# Patient Record
Sex: FEMALE | Race: OTHER | URBAN - METROPOLITAN AREA
[De-identification: names, ages, dates, MRNs, and addresses within clinical notes are randomized per-mention and may not be internally consistent; named-entity substitution may affect disease eponyms.]

---

## 2017-03-13 ENCOUNTER — IMPORTED ENCOUNTER (OUTPATIENT)
Dept: URBAN - METROPOLITAN AREA CLINIC 50 | Facility: CLINIC | Age: 72
End: 2017-03-13

## 2017-04-17 ENCOUNTER — IMPORTED ENCOUNTER (OUTPATIENT)
Dept: URBAN - METROPOLITAN AREA CLINIC 50 | Facility: CLINIC | Age: 72
End: 2017-04-17

## 2017-08-16 ENCOUNTER — IMPORTED ENCOUNTER (OUTPATIENT)
Dept: URBAN - METROPOLITAN AREA CLINIC 50 | Facility: CLINIC | Age: 72
End: 2017-08-16

## 2017-08-16 NOTE — PATIENT DISCUSSION
"""Reassured patient that intraocular pressures are at target levels and other ocular findings are stable. Continue present management and/or medication(s).  Follow up as directed. "" ""Mild Primary Open Angle Glaucoma

## 2017-12-20 ENCOUNTER — IMPORTED ENCOUNTER (OUTPATIENT)
Dept: URBAN - METROPOLITAN AREA CLINIC 50 | Facility: CLINIC | Age: 72
End: 2017-12-20

## 2017-12-20 NOTE — PATIENT DISCUSSION
"""Reassured patient that intraocular pressures are at target levels and other ocular findings are stable. Continue present management and/or medication(s).  Follow up as directed. """

## 2018-04-13 ENCOUNTER — IMPORTED ENCOUNTER (OUTPATIENT)
Dept: URBAN - METROPOLITAN AREA CLINIC 50 | Facility: CLINIC | Age: 73
End: 2018-04-13

## 2018-04-16 ENCOUNTER — IMPORTED ENCOUNTER (OUTPATIENT)
Dept: URBAN - METROPOLITAN AREA CLINIC 50 | Facility: CLINIC | Age: 73
End: 2018-04-16

## 2018-04-18 ENCOUNTER — IMPORTED ENCOUNTER (OUTPATIENT)
Dept: URBAN - METROPOLITAN AREA CLINIC 50 | Facility: CLINIC | Age: 73
End: 2018-04-18

## 2018-08-22 ENCOUNTER — IMPORTED ENCOUNTER (OUTPATIENT)
Dept: URBAN - METROPOLITAN AREA CLINIC 50 | Facility: CLINIC | Age: 73
End: 2018-08-22

## 2018-08-22 NOTE — PATIENT DISCUSSION
"""Recommend annual dilated exams. Discussed importance of glucose control and healthy lifestyle.  Continue regular follow ups with primary care physician."""

## 2018-12-17 ENCOUNTER — IMPORTED ENCOUNTER (OUTPATIENT)
Dept: URBAN - METROPOLITAN AREA CLINIC 50 | Facility: CLINIC | Age: 73
End: 2018-12-17

## 2019-04-15 ENCOUNTER — IMPORTED ENCOUNTER (OUTPATIENT)
Dept: URBAN - METROPOLITAN AREA CLINIC 50 | Facility: CLINIC | Age: 74
End: 2019-04-15

## 2019-08-19 ENCOUNTER — IMPORTED ENCOUNTER (OUTPATIENT)
Dept: URBAN - METROPOLITAN AREA CLINIC 50 | Facility: CLINIC | Age: 74
End: 2019-08-19

## 2019-08-19 NOTE — PATIENT DISCUSSION
"""Follow drusen without treatment. Call if vision or distortion increases.  Recommend good diet of ""

## 2019-08-19 NOTE — PATIENT DISCUSSION
"""Her pressure is stable on current drop regimen.  Will continue and get HVF/ RNFL OCT next visit."""

## 2020-02-17 ENCOUNTER — IMPORTED ENCOUNTER (OUTPATIENT)
Dept: URBAN - METROPOLITAN AREA CLINIC 50 | Facility: CLINIC | Age: 75
End: 2020-02-17

## 2020-08-17 ENCOUNTER — IMPORTED ENCOUNTER (OUTPATIENT)
Dept: URBAN - METROPOLITAN AREA CLINIC 50 | Facility: CLINIC | Age: 75
End: 2020-08-17

## 2020-12-16 ENCOUNTER — IMPORTED ENCOUNTER (OUTPATIENT)
Dept: URBAN - METROPOLITAN AREA CLINIC 50 | Facility: CLINIC | Age: 75
End: 2020-12-16

## 2020-12-16 NOTE — PATIENT DISCUSSION
"""Reassured patient that intraocular pressures (IOPs) are at target levels and other ocular findings are stable. Continue present management and/or medication(s).  Return to clinic in 6 months for follow up. """

## 2020-12-22 ENCOUNTER — IMPORTED ENCOUNTER (OUTPATIENT)
Dept: URBAN - METROPOLITAN AREA CLINIC 50 | Facility: CLINIC | Age: 75
End: 2020-12-22

## 2021-02-09 ENCOUNTER — IMPORTED ENCOUNTER (OUTPATIENT)
Dept: URBAN - METROPOLITAN AREA CLINIC 50 | Facility: CLINIC | Age: 76
End: 2021-02-09

## 2021-04-17 ASSESSMENT — TONOMETRY
OD_IOP_MMHG: 16
OD_IOP_MMHG: 15
OD_IOP_MMHG: 15
OS_IOP_MMHG: 19
OD_IOP_MMHG: 15
OD_IOP_MMHG: 16
OD_IOP_MMHG: 14
OS_IOP_MMHG: 16
OD_IOP_MMHG: 15
OS_IOP_MMHG: 15
OD_IOP_MMHG: 14
OS_IOP_MMHG: 16
OD_IOP_MMHG: 17
OS_IOP_MMHG: 21
OD_IOP_MMHG: 14
OD_IOP_MMHG: 16
OD_IOP_MMHG: 16
OD_IOP_MMHG: 13
OS_IOP_MMHG: 14
OS_IOP_MMHG: 15
OD_IOP_MMHG: 16
OS_IOP_MMHG: 13
OS_IOP_MMHG: 19
OS_IOP_MMHG: 17
OS_IOP_MMHG: 14
OS_IOP_MMHG: 18
OS_IOP_MMHG: 15
OD_IOP_MMHG: 16
OS_IOP_MMHG: 17
OS_IOP_MMHG: 20
OS_IOP_MMHG: 18
OD_IOP_MMHG: 14
OS_IOP_MMHG: 16
OD_IOP_MMHG: 15
OS_IOP_MMHG: 17
OS_IOP_MMHG: 16
OS_IOP_MMHG: 16
OD_IOP_MMHG: 14
OD_IOP_MMHG: 16
OS_IOP_MMHG: 18
OS_IOP_MMHG: 18
OD_IOP_MMHG: 17
OD_IOP_MMHG: 15
OS_IOP_MMHG: 16
OS_IOP_MMHG: 22
OD_IOP_MMHG: 15

## 2021-04-17 ASSESSMENT — PACHYMETRY
OD_CT_UM: 559
OS_CT_UM: 577
OS_CT_UM: 577
OD_CT_UM: 559
OS_CT_UM: 577
OD_CT_UM: 559
OS_CT_UM: 577
OS_CT_UM: 577
OD_CT_UM: 559
OS_CT_UM: 577
OS_CT_UM: 577
OD_CT_UM: 559
OD_CT_UM: 559
OS_CT_UM: 577
OS_CT_UM: 577
OD_CT_UM: 559
OS_CT_UM: 577
OS_CT_UM: 577
OD_CT_UM: 559
OS_CT_UM: 577

## 2021-04-17 ASSESSMENT — VISUAL ACUITY
OD_CC: 20/30+2
OS_CC: J1+@ 16 IN
OD_CC: J1+@ 16 IN
OD_CC: 20/25
OD_OTHER: 20/40. 20/70.
OD_BAT: 20/40
OD_OTHER: 20/80. 20/100.
OS_BAT: 20/25
OD_CC: 20/25-
OS_CC: 20/25-2
OS_PH: 20/30+
OD_CC: J1+@ 13 IN
OD_CC: 20/25
OS_CC: 20/40
OS_CC: 20/25
OD_CC: J1+
OD_CC: J1+
OS_CC: 20/25-
OS_OTHER: 20/25. 20/30.
OD_OTHER: 20/30. 20/40.
OS_CC: J1+@ 16 IN
OD_CC: 20/25-
OS_CC: J1+@ 16 IN
OS_CC: J1+@ 13 IN
OD_CC: J1+@ 16 IN
OD_CC: 20/25
OS_OTHER: 20/30. 20/50.
OS_CC: 20/25+1
OS_CC: J1+
OS_CC: 20/20=
OD_CC: 20/25
OD_CC: 20/25
OD_CC: 20/25+1
OS_CC: J1+
OD_BAT: 20/80
OD_BAT: 20/30
OS_CC: 20/20
OD_CC: J1+@ 16 IN
OS_CC: 20/25
OS_BAT: 20/30
OS_PH: 20/25
OS_CC: 20/25
OS_CC: 20/25-
OS_CC: 20/30+
OS_CC: 20/25
OD_CC: 20/25+/-1